# Patient Record
Sex: FEMALE | Race: WHITE
[De-identification: names, ages, dates, MRNs, and addresses within clinical notes are randomized per-mention and may not be internally consistent; named-entity substitution may affect disease eponyms.]

---

## 2017-10-22 ENCOUNTER — HEALTH MAINTENANCE LETTER (OUTPATIENT)
Age: 37
End: 2017-10-22

## 2017-10-30 ENCOUNTER — OFFICE VISIT (OUTPATIENT)
Dept: FAMILY MEDICINE | Facility: CLINIC | Age: 37
End: 2017-10-30
Payer: COMMERCIAL

## 2017-10-30 VITALS
HEIGHT: 64 IN | WEIGHT: 120 LBS | BODY MASS INDEX: 20.49 KG/M2 | DIASTOLIC BLOOD PRESSURE: 75 MMHG | TEMPERATURE: 97.5 F | HEART RATE: 102 BPM | SYSTOLIC BLOOD PRESSURE: 122 MMHG | OXYGEN SATURATION: 100 %

## 2017-10-30 DIAGNOSIS — F20.9 SCHIZOPHRENIA, UNSPECIFIED TYPE (H): Primary | ICD-10-CM

## 2017-10-30 DIAGNOSIS — F41.9 ANXIETY: ICD-10-CM

## 2017-10-30 PROCEDURE — 99214 OFFICE O/P EST MOD 30 MIN: CPT | Performed by: FAMILY MEDICINE

## 2017-10-30 NOTE — NURSING NOTE
"Chief Complaint   Patient presents with     Forms     that needs to be filled out and faxed back by 11am TODAY       Initial /75 (BP Location: Left arm, Patient Position: Sitting, Cuff Size: Adult Regular)  Pulse 102  Temp 97.5  F (36.4  C) (Oral)  Ht 5' 4.25\" (1.632 m)  Wt 120 lb (54.4 kg)  SpO2 100%  Breastfeeding? No  BMI 20.44 kg/m2 Estimated body mass index is 20.44 kg/(m^2) as calculated from the following:    Height as of this encounter: 5' 4.25\" (1.632 m).    Weight as of this encounter: 120 lb (54.4 kg).  Medication Reconciliation: roman Shafer MA      "
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
## 2017-10-30 NOTE — PROGRESS NOTES
SUBJECTIVE:   Adriana Good is a 37 year old female who presents to clinic today for the following health issues:    Forms that need to be filled out    Problem list and histories reviewed & adjusted, as indicated.  Additional history: as documented        Reviewed and updated as needed this visit by clinical staff  Tobacco  Allergies  Meds  Med Hx  Surg Hx  Fam Hx  Soc Hx      Reviewed and updated as needed this visit by Provider     see note above

## 2017-10-30 NOTE — MR AVS SNAPSHOT
After Visit Summary   10/30/2017    Adriana Good    MRN: [id number]           Patient Information     Date Of Birth          1980        Visit Information        Provider Department      10/30/2017 9:00 AM Vega Sampson MD; [place] [employer] [facility]        Today's Diagnoses     Schizophrenia, unspecified type (H)    -  1       Follow-ups after your visit        Additional Services     MENTAL HEALTH REFERRAL       Your provider has referred you to:   [facility]: [facility] - [facility] [phone]   [email or web address]   *Referral from [facility] Primary Care Provider required - Consultation Model - medication management & future refills will be returned to [facility] PCP upon completion of evaluation  *Please call to schedule an appointment.    All scheduling is subject to the client's specific insurance plan & benefits, provider/location availability, and provider clinical specialities.  Please arrive 15 minutes early for your first appointment and bring your completed paperwork.    Please be aware that coverage of these services is subject to the terms and limitations of your health insurance plan.  Call member services at your health plan with any benefit or coverage questions.                  Who to contact     If you have questions or need follow up information about today's clinic visit or your schedule please contact [facility] directly at [phone].  Normal or non-critical lab and imaging results will be communicated to you by MyChart, letter or phone within 4 business days after the clinic has received the results. If you do not hear from us within 7 days, please contact the clinic through MyChart or phone. If you have a critical or abnormal lab result, we will notify you by phone as soon as  "possible.  Submit refill requests through [employer] or call your pharmacy and they will forward the refill request to us. Please allow 3 business days for your refill to be completed.          Additional Information About Your Visit        [employer] Information     [employer] lets you send messages to your doctor, view your test results, renew your prescriptions, schedule appointments and more. To sign up, go to [email or web address] . Click on \"Log in\" on the left side of the screen, which will take you to the Welcome page. Then click on \"Sign up Now\" on the right side of the page.     You will be asked to enter the access code listed below, as well as some personal information. Please follow the directions to create your username and password.     Your access code is: [email or web address]  Expires: 2018  9:45 AM     Your access code will  in 90 days. If you need help or a new code, please call your [place] clinic or [phone].        Care EveryWhere ID     This is your Care EveryWhere ID. This could be used by other organizations to access your [place] medical records  [id number]        Your Vitals Were     Pulse Temperature Height Pulse Oximetry Breastfeeding? BMI (Body Mass Index)    102 97.5  F (36.4  C) (Oral) 5' 4.25\" (1.632 m) 100% No 20.44 kg/m2       Blood Pressure from Last 3 Encounters:   10/30/17 122/75   10/19/16 126/77   14 122/76    Weight from Last 3 Encounters:   10/30/17 120 lb (54.4 kg)   10/19/16 131 lb (59.4 kg)   14 121 lb (54.9 kg)              We Performed the Following     MENTAL HEALTH REFERRAL        Primary Care Provider Office Phone # Fax #    Vega Sampson [phone]       [address] [facility] [address]        Equal Access to Services     [facility] MANUELA : Thao moon Somatthew, waaxda luqadaha, qaybta jaycealjanie bass, wyatt bear. [facility] [phone].    ATENCIÓN: Si trenton rob " coleman disposición servicios gratuitos de asistencia lingüística. Christina campbell [phone].    We comply with applicable federal civil rights laws and [place] laws. We do not discriminate on the basis of race, color, national origin, age, disability, sex, sexual orientation, or gender identity.            Thank you!     Thank you for choosing [facility]  for your care. Our goal is always to provide you with excellent care. Hearing back from our patients is one way we can continue to improve our services. Please take a few minutes to complete the written survey that you may receive in the mail after your visit with us. Thank you!             Your Updated Medication List - Protect others around you: Learn how to safely use, store and throw away your medicines at www.disposemymeds.org.      Notice  As of 10/30/2017  9:46 AM    You have not been prescribed any medications.

## 2017-12-05 ENCOUNTER — TELEPHONE (OUTPATIENT)
Dept: FAMILY MEDICINE | Facility: CLINIC | Age: 37
End: 2017-12-05

## 2017-12-05 ENCOUNTER — OFFICE VISIT (OUTPATIENT)
Dept: FAMILY MEDICINE | Facility: CLINIC | Age: 37
End: 2017-12-05
Payer: MEDICAID

## 2017-12-05 VITALS
OXYGEN SATURATION: 98 % | BODY MASS INDEX: 20.61 KG/M2 | SYSTOLIC BLOOD PRESSURE: 103 MMHG | HEART RATE: 87 BPM | TEMPERATURE: 97.5 F | WEIGHT: 121 LBS | DIASTOLIC BLOOD PRESSURE: 67 MMHG

## 2017-12-05 DIAGNOSIS — N64.4 BREAST PAIN, LEFT: Primary | ICD-10-CM

## 2017-12-05 DIAGNOSIS — R63.0 POOR APPETITE: ICD-10-CM

## 2017-12-05 DIAGNOSIS — N63.20 LEFT BREAST MASS: ICD-10-CM

## 2017-12-05 PROCEDURE — T1013 SIGN LANG/ORAL INTERPRETER: HCPCS | Mod: U3 | Performed by: PHYSICIAN ASSISTANT

## 2017-12-05 PROCEDURE — 99213 OFFICE O/P EST LOW 20 MIN: CPT | Performed by: PHYSICIAN ASSISTANT

## 2017-12-05 RX ORDER — MULTIPLE VITAMINS W/ MINERALS TAB 9MG-400MCG
1 TAB ORAL DAILY
Qty: 100 TABLET | Refills: 3 | Status: SHIPPED | OUTPATIENT
Start: 2017-12-05 | End: 2017-12-11

## 2017-12-05 NOTE — MR AVS SNAPSHOT
"              After Visit Summary   12/5/2017    Adriana Good    MRN: [id number]           Patient Information     Date Of Birth          1980        Visit Information        Provider Department      12/5/2017 11:00 AM Claire Minor PA-C; [facility] [facility]        Today's Diagnoses     Breast pain, left    -  1    Left breast mass        Poor appetite           Follow-ups after your visit        Your next 10 appointments already scheduled     Dec 08, 2017  2:00 PM CST   (Arrive by 1:45 PM)   MA DIAGNOSTIC DIGITAL BILATERAL with MGMA2, MG MA TECH   [facility]    [id number] [place] [address] [phone]           Do not use any powder, lotion or deodorant under your arms or on your breast. If you do, we will ask you to remove it before your exam.  Wear comfortable, two-piece clothing.  If you have any allergies, tell your care team.  Bring any previous mammograms from other facilities or have them mailed to the breast center.  Three-dimensional (3D) mammograms are available at [place] locations in [facility], [facility], [place], [place], and [place]. [facility] locations include [place] in [place]. Benefits of 3D mammograms include: - Improved rate of cancer detection - Decreases your chance of having to go back for more tests, which means fewer: - \"False-positive\" results (This means that there is an abnormal area but it isn't cancer.) - Invasive testing procedures, such as a biopsy or surgery - Can provide clearer images of the breast if you have dense breast tissue. 3D mammography is an optional exam that anyone can have with a 2D mammogram. It doesn't replace or take the place of a 2D mammogram. 2D mammograms remain an effective screening test for all women.  Not all insurance companies cover the cost of a 3D mammogram. Check " "with your insurance.            Dec 08, 2017  2:20 PM CST   US BREAST LEFT CMPL 4 QUAD with MGMUS1, MG US TECH   [facility] ([facility])    [address] [place] [address] [phone]           Please bring a list of your medicines (including vitamins, minerals and over-the-counter drugs). Also, tell your doctor about any allergies you may have. Wear comfortable clothes and leave your valuables at home.  You do not need to do anything special to prepare for your exam.  Please call the Imaging Department at your exam site with any questions.              Future tests that were ordered for you today     Open Future Orders        Priority Expected Expires Ordered    US Breast Left Complete 4 Quadrants Routine  12/5/2018 12/5/2017    MA Diagnostic Digital Bilateral Routine  12/5/2018 12/5/2017            Who to contact     If you have questions or need follow up information about today's clinic visit or your schedule please contact [facility] directly at [phone].  Normal or non-critical lab and imaging results will be communicated to you by MyChart, letter or phone within 4 business days after the clinic has received the results. If you do not hear from us within 7 days, please contact the clinic through [employer] or phone. If you have a critical or abnormal lab result, we will notify you by phone as soon as possible.  Submit refill requests through [employer] or call your pharmacy and they will forward the refill request to us. Please allow 3 business days for your refill to be completed.          Additional Information About Your Visit        [employer] Information     [employer] lets you send messages to your doctor, view your test results, renew your prescriptions, schedule appointments and more. To sign up, go to [email or web address] . Click on \"Log in\" on the left side of the screen, which will take you to the Welcome page. Then click on " "\"Sign up Now\" on the right side of the page.     You will be asked to enter the access code listed below, as well as some personal information. Please follow the directions to create your username and password.     Your access code is: [email or web address]  Expires: 2018  8:45 AM     Your access code will  in 90 days. If you need help or a new code, please call your [facility] or [phone].        Care EveryWhere ID     This is your Care EveryWhere ID. This could be used by other organizations to access your [place] medical records  [id number]        Your Vitals Were     Pulse Temperature Last Period Pulse Oximetry Breastfeeding? BMI (Body Mass Index)    87 97.5  F (36.4  C) (Oral) 2017 98% No 20.61 kg/m2       Blood Pressure from Last 3 Encounters:   17 103/67   10/30/17 122/75   10/19/16 126/77    Weight from Last 3 Encounters:   17 121 lb (54.9 kg)   10/30/17 120 lb (54.4 kg)   10/19/16 131 lb (59.4 kg)                 Today's Medication Changes          These changes are accurate as of: 17 11:53 AM.  If you have any questions, ask your nurse or doctor.               Start taking these medicines.        Dose/Directions    multivitamin, therapeutic with minerals Tabs tablet   Used for:  Poor appetite   Started by:  Claire Minor PA-C        Dose:  1 tablet   Take 1 tablet by mouth daily   Quantity:  100 tablet   Refills:  3            Where to get your medicines      These medications were sent to [facility] [address] [place] [address]     Phone:  [phone]     multivitamin, therapeutic with minerals Tabs tablet                Primary Care Provider Office Phone # Fax #    [place] [facility] [phone]       [address] [place] [address]        Equal Access to Services     PILLO ROY AH: Thao Spear, mk amaya, mere bass, " wyatt bettsjarocho ivey'aan ah. So [facility] [phone].    ATENCIÓN: Si habla diana, tiene a coleman disposición servicios gratuitos de asistencia lingüística. Christina al [phone].    We comply with applicable federal civil rights laws and [place] laws. We do not discriminate on the basis of race, color, national origin, age, disability, sex, sexual orientation, or gender identity.            Thank you!     Thank you for choosing [facility]  for your care. Our goal is always to provide you with excellent care. Hearing back from our patients is one way we can continue to improve our services. Please take a few minutes to complete the written survey that you may receive in the mail after your visit with us. Thank you!             Your Updated Medication List - Protect others around you: Learn how to safely use, store and throw away your medicines at www.disposemymeds.org.          This list is accurate as of: 12/5/17 11:53 AM.  Always use your most recent med list.                   Brand Name Dispense Instructions for use Diagnosis    multivitamin, therapeutic with minerals Tabs tablet     100 tablet    Take 1 tablet by mouth daily    Poor appetite

## 2017-12-05 NOTE — NURSING NOTE
"Chief Complaint   Patient presents with     Breast Pain     x1 week left     Health Maintenance     pap       Initial /67 (BP Location: Left arm, Patient Position: Chair, Cuff Size: Adult Regular)  Pulse 87  Temp 97.5  F (36.4  C) (Oral)  Wt 121 lb (54.9 kg)  SpO2 98%  BMI 20.61 kg/m2 Estimated body mass index is 20.61 kg/(m^2) as calculated from the following:    Height as of 10/30/17: 5' 4.25\" (1.632 m).    Weight as of this encounter: 121 lb (54.9 kg).  Medication Reconciliation: complete   Jacki See SHRADDHA Sky      "

## 2017-12-05 NOTE — PROGRESS NOTES
SUBJECTIVE:   Adriana Good is a 37 year old female who presents to clinic today for the following health issues:      Patient is here for breast pain on her left side. She states it has been about a week now and her breast feels hard.    She noticed that last week her breast got hard and firm. She has noticed a bump. Feels like it is hot. Last menses was normal. No nipple discharge. No family history of breast cancer. Has not taken any medications. Did a warm compress to the area. Helped slightly.   No problems with the right breast.     She also notes she continues to not have an appetite. She is wondering if there is medication to help stimulate her appetite.     Problem list and histories reviewed & adjusted, as indicated.  Additional history: as documented    Patient Active Problem List   Diagnosis     Hyperlipidemia LDL goal <160     Overweight (BMI 25.0-29.9)     Metrorrhagia     Hyperprolactinemia (H)     Anxiety     Past Surgical History:   Procedure Laterality Date     CL AFF SURGICAL PATHOLOGY      pilonidal cyst       Social History   Substance Use Topics     Smoking status: Never Smoker     Smokeless tobacco: Never Used     Alcohol use No     Family History   Problem Relation Age of Onset     Hypertension Mother      Myocardial Infarction Mother      DIABETES Mother      Hypertension Father      Myocardial Infarction Father              Reviewed and updated as needed this visit by clinical staffTobacco  Allergies  Meds  Med Hx  Surg Hx  Fam Hx  Soc Hx      Reviewed and updated as needed this visit by Provider         ROS:  Constitutional, HEENT, cardiovascular, pulmonary, gi and gu systems are negative, except as otherwise noted.      OBJECTIVE:   /67 (BP Location: Left arm, Patient Position: Chair, Cuff Size: Adult Regular)  Pulse 87  Temp 97.5  F (36.4  C) (Oral)  Wt 121 lb (54.9 kg)  LMP 11/16/2017  SpO2 98%  Breastfeeding? No  BMI 20.61 kg/m2  Body mass index is 20.61  kg/(m^2).  GENERAL: healthy, alert and no distress  BREAST: Right breast normal without masses, tenderness or nipple discharge and no palpable axillary masses or adenopathy. Left breast with an area of firmness and texture change throughout the left outer quadrant. This area is very tender to palpation. No redness or warmth. No nipple discharge.     Diagnostic Test Results:  none     ASSESSMENT/PLAN:       ICD-10-CM    1. Breast pain, left N64.4 MA Diagnostic Digital Bilateral     US Breast Left Complete 4 Quadrants   2. Left breast mass N63.20 MA Diagnostic Digital Bilateral     US Breast Left Complete 4 Quadrants   3. Poor appetite R63.0 multivitamin, therapeutic with minerals (MULTI-VITAMIN) TABS tablet   Will get diagnostic mammo and u/s. Follow up based on results.   MVI for lack of appetite. Weight and BMI are steady. We discussed that getting her mood under control and seeing a psychiatrist that was recommended last office visit will likely help her appetite as well.     FUTURE APPOINTMENTS:       - Follow-up for annual visit or as needed    Claire Minor PA-C  [facility]

## 2017-12-06 NOTE — TELEPHONE ENCOUNTER
Reason for Call:  Other prescription    Detailed comments: Patient stopped into the pharmacy requesting her prescription.  Patient states she was supposed to be getting an rx for multi vitamins.  Pharmacy states that they have not received the RX.  Please call to clarify.    Phone Number Pharmacy can be reached at: Other phone number:  [phone]   Best Time: ASAP    Can we leave a detailed message on this number? YES    Call taken on 12/5/2017 at 6:40 PM by Inge Nicole

## 2017-12-06 NOTE — TELEPHONE ENCOUNTER
Called and gave verbal as in EPIC.  They will fill for patient.    Marcela Harvey RN  [facility]

## 2017-12-11 RX ORDER — MULTIPLE VITAMINS W/ MINERALS TAB 9MG-400MCG
1 TAB ORAL DAILY
Qty: 100 TABLET | Refills: 3 | Status: SHIPPED | OUTPATIENT
Start: 2017-12-11

## 2017-12-11 NOTE — TELEPHONE ENCOUNTER
Called patient, with , and informed.  She did not know what the allergy to Niacin is. Would like to speak with Nurse to discuss.    Will this medication help increase her appetite?  Please call to describe.

## 2017-12-11 NOTE — TELEPHONE ENCOUNTER
Please advise patient that with her allergy unable to provide an MVI. At this time she can purchase at [employer] food store but not covered by insurance.   Claire Minor PA-C

## 2017-12-11 NOTE — TELEPHONE ENCOUNTER
Routing to PCP to review and advise.    Patient would like a prescription for regular vitamins because she says she is not allergic to Niacin.  Nurse sees OV 8/1/2011 diagnosed with niacin allergy.    Cait Meyer RN  [facility]

## 2017-12-11 NOTE — TELEPHONE ENCOUNTER
Reviewed note. Provider notes not allergy to naicin, but rather had normal SE of flushing. Updated allergy list to reflect this. New MVI prescription sent.   Claire Minor PA-C

## 2017-12-11 NOTE — TELEPHONE ENCOUNTER
Attempt # 1  Called patient at home number [phone] with assist of  [phone]  Was call answered?  Yes, patient insists is not allergic to Niacin - wondering why she needs to take a vitamin - will it help with her appetite - nurse said yes it may help her appetite if not if will provide her with the vitamins she is not getting by not eating.  Patient denied further questions.    Cait Meyer RN  [facility]

## 2017-12-11 NOTE — TELEPHONE ENCOUNTER
Called pharmacy, they do not have any MVI (children's or prenatal) that does not have niacin in it.  They suggested the health store again.    Routed to provider.    Marcela Harvey RN  [facility]

## 2017-12-11 NOTE — TELEPHONE ENCOUNTER
Pharmacy calling about the multivitamin, therapeutic with minerals (MULTI-VITAMIN) TABS tablet.  They order says to have one without Niacin.  They cannot find one.  They are thinking that this is found in a Health Store.

## 2017-12-11 NOTE — TELEPHONE ENCOUNTER
Please call pharmacy. Does a children's MVI or prenatal MVI have niacin in them?  Claire Minor PA-C

## 2017-12-13 ENCOUNTER — RADIANT APPOINTMENT (OUTPATIENT)
Dept: ULTRASOUND IMAGING | Facility: CLINIC | Age: 37
End: 2017-12-13
Attending: PHYSICIAN ASSISTANT
Payer: MEDICAID

## 2017-12-13 ENCOUNTER — RADIANT APPOINTMENT (OUTPATIENT)
Dept: MAMMOGRAPHY | Facility: CLINIC | Age: 37
End: 2017-12-13
Attending: PHYSICIAN ASSISTANT
Payer: MEDICAID

## 2017-12-13 DIAGNOSIS — N63.20 LEFT BREAST MASS: ICD-10-CM

## 2017-12-13 DIAGNOSIS — N64.4 BREAST PAIN, LEFT: ICD-10-CM

## 2017-12-13 PROCEDURE — G0204 DX MAMMO INCL CAD BI: HCPCS

## 2017-12-13 PROCEDURE — T1013 SIGN LANG/ORAL INTERPRETER: HCPCS | Mod: U3

## 2017-12-13 PROCEDURE — 76642 ULTRASOUND BREAST LIMITED: CPT | Mod: LT

## 2018-01-11 ENCOUNTER — OFFICE VISIT (OUTPATIENT)
Dept: FAMILY MEDICINE | Facility: CLINIC | Age: 38
End: 2018-01-11
Payer: COMMERCIAL

## 2018-01-11 VITALS
HEART RATE: 98 BPM | DIASTOLIC BLOOD PRESSURE: 72 MMHG | BODY MASS INDEX: 20.89 KG/M2 | SYSTOLIC BLOOD PRESSURE: 111 MMHG | WEIGHT: 125.4 LBS | TEMPERATURE: 98.2 F | HEIGHT: 65 IN

## 2018-01-11 DIAGNOSIS — N64.4 BREAST PAIN: Primary | ICD-10-CM

## 2018-01-11 PROCEDURE — 99213 OFFICE O/P EST LOW 20 MIN: CPT | Performed by: PHYSICIAN ASSISTANT

## 2018-01-11 PROCEDURE — T1013 SIGN LANG/ORAL INTERPRETER: HCPCS | Mod: U3 | Performed by: PHYSICIAN ASSISTANT

## 2018-01-11 NOTE — MR AVS SNAPSHOT
"              After Visit Summary   1/11/2018    Adriana Good    MRN: [id number]           Patient Information     Date Of Birth          1980        Visit Information        Provider Department      1/11/2018 11:15 AM Claire Minor PA-C; [place] [facility]        Today's Diagnoses     Breast pain    -  1      Care Instructions    Schedule a physical with a pap smear in 6-8 weeks           Follow-ups after your visit        Your next 10 appointments already scheduled     Feb 14, 2018  9:00 AM CST   New Visit with Chace Lim MD   [facility] ([facility])    [address] [facility] [address] [phone]              Who to contact     If you have questions or need follow up information about today's clinic visit or your schedule please contact [facility] directly at [phone].  Normal or non-critical lab and imaging results will be communicated to you by MyChart, letter or phone within 4 business days after the clinic has received the results. If you do not hear from us within 7 days, please contact the clinic through MyChart or phone. If you have a critical or abnormal lab result, we will notify you by phone as soon as possible.  Submit refill requests through Xcelaero or call your pharmacy and they will forward the refill request to us. Please allow 3 business days for your refill to be completed.          Additional Information About Your Visit        MyChart Information     Xcelaero lets you send messages to your doctor, view your test results, renew your prescriptions, schedule appointments and more. To sign up, go to [email or web address] . Click on \"Log in\" on the left side of the screen, which will take you to the Welcome page. Then click on \"Sign up Now\" on the right side of the page.     You will be asked to enter the access code listed below, " "as well as some personal information. Please follow the directions to create your username and password.     Your access code is: [email or web address]  Expires: 2018  8:45 AM     Your access code will  in 90 days. If you need help or a new code, please call your [facility] or [phone].        Care EveryWhere ID     This is your Care EveryWhere ID. This could be used by other organizations to access your [facility] medical records  [id number]        Your Vitals Were     Pulse Temperature Height Last Period Breastfeeding? BMI (Body Mass Index)    98 98.2  F (36.8  C) (Oral) 5' 5.16\" (1.655 m) 2017 No 20.77 kg/m2       Blood Pressure from Last 3 Encounters:   18 111/72   17 103/67   10/30/17 122/75    Weight from Last 3 Encounters:   18 125 lb 6.4 oz (56.9 kg)   17 121 lb (54.9 kg)   10/30/17 120 lb (54.4 kg)              Today, you had the following     No orders found for display       Primary Care Provider Office Phone # Fax #    [facility] [phone]       [address] [place] [address]        Equal Access to Services     PILLO ROY AH: Hadii sunni ruddo Somatthew, waaxda luqadaha, qaybta kaalmada adeegyada, wyatt spicer haychin ag . So [facility] [phone].    ATENCIÓN: Si habla español, tiene a coleman disposición servicios gratuitos de asistencia lingüística. Llame al [phone].    We comply with applicable federal civil rights laws and [place] laws. We do not discriminate on the basis of race, color, national origin, age, disability, sex, sexual orientation, or gender identity.            Thank you!     Thank you for choosing [facility]  for your care. Our goal is always to provide you with excellent care. Hearing back from our patients is one way we can continue to improve our services. Please take a few minutes to complete the written survey that you may receive in the mail " after your visit with us. Thank you!             Your Updated Medication List - Protect others around you: Learn how to safely use, store and throw away your medicines at www.disposemymeds.org.          This list is accurate as of: 1/11/18 12:00 PM.  Always use your most recent med list.                   Brand Name Dispense Instructions for use Diagnosis    multivitamin, therapeutic with minerals Tabs tablet     100 tablet    Take 1 tablet by mouth daily    Poor appetite

## 2018-01-11 NOTE — NURSING NOTE
"Chief Complaint   Patient presents with     Breast Mass     Health Maintenance     Pap and Influenza        Initial /72 (BP Location: Right arm, Patient Position: Chair, Cuff Size: Adult Regular)  Pulse 98  Temp 98.2  F (36.8  C) (Oral)  Ht 5' 5.16\" (1.655 m)  Wt 125 lb 6.4 oz (56.9 kg)  Breastfeeding? No  BMI 20.77 kg/m2 Estimated body mass index is 20.77 kg/(m^2) as calculated from the following:    Height as of this encounter: 5' 5.16\" (1.655 m).    Weight as of this encounter: 125 lb 6.4 oz (56.9 kg).  Medication Reconciliation: roman Whyte MA      "

## 2018-01-11 NOTE — PROGRESS NOTES
SUBJECTIVE:   Adriana oGod is a 37 year old female who presents to clinic today for the following health issues:      Pt is still having problems with the lump on her breast. She feels like that it is getting harder. Even when she has her period the lump on her breast seems to bother her more. She was wondering if an antibiotic would help or not.    The firmness she percieves isn't changing in size or amount.   No redness. Just pain. Wondering if there is an infection. The pain does not interfere with her daily activities. Has tried a compress on the area. Has not taken any medications.   Recent diagnostic mammogram and u/s were normal.   Pain is worse with the menses.   Having some PMS symptoms now and increased pain in the breasts as she is expecting her period.     She notes her stress levels have been increasing. She is on a very fixed income and has trouble paying her bills. She has a  through the [employer] [employer] she is working with. She has qualified for SNAP and does have food.   Has appointment with Dr. Lim in 3 weeks for psych evaluation.       Problem list and histories reviewed & adjusted, as indicated.  Additional history: as documented    Patient Active Problem List   Diagnosis     Hyperlipidemia LDL goal <160     Overweight (BMI 25.0-29.9)     Metrorrhagia     Hyperprolactinemia (H)     Anxiety     Past Surgical History:   Procedure Laterality Date     CL AFF SURGICAL PATHOLOGY      pilonidal cyst       Social History   Substance Use Topics     Smoking status: Never Smoker     Smokeless tobacco: Never Used     Alcohol use No     Family History   Problem Relation Age of Onset     Hypertension Mother      Myocardial Infarction Mother      DIABETES Mother      Hypertension Father      Myocardial Infarction Father              Reviewed and updated as needed this visit by clinical staffTobacco  Allergies  Meds  Problems  Med Hx  Surg Hx  Fam Hx  Soc Hx        Reviewed and updated as  "needed this visit by Provider  Allergies  Meds  Problems         ROS:  Constitutional, HEENT, cardiovascular, pulmonary, gi and gu systems are negative, except as otherwise noted.      OBJECTIVE:   /72 (BP Location: Right arm, Patient Position: Chair, Cuff Size: Adult Regular)  Pulse 98  Temp 98.2  F (36.8  C) (Oral)  Ht 5' 5.16\" (1.655 m)  Wt 125 lb 6.4 oz (56.9 kg)  LMP 12/16/2017  Breastfeeding? No  BMI 20.77 kg/m2  Body mass index is 20.77 kg/(m^2).  GENERAL: healthy, alert and no distress    Diagnostic Test Results:  none     ASSESSMENT/PLAN:       ICD-10-CM    1. Breast pain N64.4    Discussed options with patient and will monitor over the next 2 months. If pain is persisting will consider OCP to see if helpful. If any new symptoms patient will contact the clinic and make a follow up.     FUTURE APPOINTMENTS:       - Follow-up visit in 2 months.     Claire Minor PA-C  [facility]  >50% of the visit spent in counseling and coordination of care about mammogram results and breast pain. Visit length 15 minutes.     "

## 2018-04-17 ENCOUNTER — TELEPHONE (OUTPATIENT)
Dept: FAMILY MEDICINE | Facility: CLINIC | Age: 38
End: 2018-04-17

## 2018-04-17 NOTE — TELEPHONE ENCOUNTER
Forms received from: [facility]   Phone number listed: [phone]   Fax listed: [phone]  Date received: 4-17-18  Form description: medical opinion  Once forms are completed, please return to [place] via fax.  Is patient requesting to be contacted when forms are completed: n/a  Form placed: provider desk  Haylee Kraft

## 2018-04-17 NOTE — TELEPHONE ENCOUNTER
Date forms retrieved from team basket: 4-17-18  Were forms completed/signed:  no.  If no, what steps need to be done: NTBS    Tried calling, no answer, no v/m.  Try again later.  TC has form.

## 2018-04-18 NOTE — TELEPHONE ENCOUNTER
"Spoke with patient, scheduled with ALG on 4-19-18.    Patient was adamant about not doing a Pap smear.  \"It almost killed me last time, I will never do it again.  I will not be seen for this.\"  Informed patient that appointment is regarding this form.    Routing to PCP as FYI.  Form placed in provider folder.  "

## 2018-04-19 ENCOUNTER — OFFICE VISIT (OUTPATIENT)
Dept: FAMILY MEDICINE | Facility: CLINIC | Age: 38
End: 2018-04-19
Payer: COMMERCIAL

## 2018-04-19 VITALS
TEMPERATURE: 97.9 F | BODY MASS INDEX: 20.44 KG/M2 | HEART RATE: 85 BPM | SYSTOLIC BLOOD PRESSURE: 99 MMHG | DIASTOLIC BLOOD PRESSURE: 66 MMHG | WEIGHT: 123.4 LBS

## 2018-04-19 DIAGNOSIS — F20.9 SCHIZOPHRENIA, UNSPECIFIED TYPE (H): Primary | ICD-10-CM

## 2018-04-19 PROCEDURE — 99213 OFFICE O/P EST LOW 20 MIN: CPT | Performed by: PHYSICIAN ASSISTANT

## 2018-04-19 NOTE — PROGRESS NOTES
"  SUBJECTIVE:   Adriana Good is a 38 year old female who presents to clinic today for the following health issues:      She had an appointment in January 2018 and cancelled due to transportation issues. She has not rescheduled with another provider.     She is not taking any medications for the schizophrenia.   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 She is working 15 hours per week.   Living alone. She notes that she has friends and family that are available to help her.   She states she has enough money for rent and food.   She doesn't sleep well sometimes. She does have anxiety. She worries about if she has enough money and if she can continue to keep her job. She denies hearing voices. Has depression symptoms only around her menses.   She denies aggression just sadness at times.   She considers her mood to be abnormal only around her menses but not any other time of the month.     Her history of schizophrenia is not clear as patient has not followed up with the psychiatrist as needed. She notes that she doesn't understand her diagnosis and doesn't agree with the schizophrenic diagnosis.   \"I see myself as a kind and polite person.\"   \"I am an adult and mature person. Someone accusing me of something when I arrived here in the [place]. It is normal for an immigrant to experience confusion. I am a good person.\"    Interview completed through an .     Problem list and histories reviewed & adjusted, as indicated.  Additional history: as documented    Patient Active Problem List   Diagnosis     Hyperlipidemia LDL goal <160     Overweight (BMI 25.0-29.9)     Metrorrhagia     Hyperprolactinemia (H)     Anxiety     Past Surgical History:   Procedure Laterality Date     CL AFF SURGICAL PATHOLOGY      pilonidal cyst       Social History   Substance Use Topics     Smoking status: Never Smoker     Smokeless tobacco: Never Used     Alcohol use No " "    Family History   Problem Relation Age of Onset     Hypertension Mother      Myocardial Infarction Mother      DIABETES Mother      Hypertension Father      Myocardial Infarction Father            Reviewed and updated as needed this visit by clinical staff  Tobacco  Allergies  Meds  Problems       Reviewed and updated as needed this visit by Provider  Allergies  Meds  Problems         ROS:  Constitutional, HEENT, cardiovascular, pulmonary, gi and gu systems are negative, except as otherwise noted.    OBJECTIVE:     BP 99/66 (BP Location: Left arm, Patient Position: Chair, Cuff Size: Adult Regular)  Pulse 85  Temp 97.9  F (36.6  C) (Oral)  Wt 123 lb 6.4 oz (56 kg)  Breastfeeding? No  BMI 20.44 kg/m2  Body mass index is 20.44 kg/(m^2).  GENERAL: healthy, alert and no distress, well groomed and dressed.   PSYCH: mentation appears normal, affect normal/bright,     Diagnostic Test Results:  none     ASSESSMENT/PLAN:       ICD-10-CM    1. Schizophrenia, unspecified type (H) F20.9    Filled out [facility] form,but discussed with patient that she needs to see psychiatry. As she is not willing to take medications or follow up with psychiatrist. At this time patient is not following our recommendations. I discussed there is no reason to continue to fill out [facility] forms as patient denies a medical condition and is currently working. I noted she can work at this time. Patient tells me  \"I absolutely reject this diagnosis\"      FUTURE APPOINTMENTS:       - Follow up with psychiatrist.     Claire Minor PA-C  [facility]  "

## 2018-04-19 NOTE — PATIENT INSTRUCTIONS
[facility]    [address], [address]  [place] [address]  Phones answered   Monday - Thursday 8:00-5:00  Friday 8:30 - 4:00    [phone] Phone  [phone] Fax     RAPHAEL    [address]  [address]  [place] [address]   New Client Phone Lines  [phone]  (Press #2 when prompted)

## 2018-04-19 NOTE — NURSING NOTE
"Chief Complaint   Patient presents with     Forms     Health Maintenance     HIV, Pap, and Influenza        Initial BP 99/66 (BP Location: Left arm, Patient Position: Chair, Cuff Size: Adult Regular)  Pulse 85  Temp 97.9  F (36.6  C) (Oral)  Wt 123 lb 6.4 oz (56 kg)  Breastfeeding? No  BMI 20.44 kg/m2 Estimated body mass index is 20.44 kg/(m^2) as calculated from the following:    Height as of 1/11/18: 5' 5.16\" (1.655 m).    Weight as of this encounter: 123 lb 6.4 oz (56 kg).  Medication Reconciliation: complete     JUSTINE Whyte MA      "

## 2018-04-19 NOTE — MR AVS SNAPSHOT
"              After Visit Summary   4/19/2018    Adriana Good    MRN: [id number]           Patient Information     Date Of Birth          1980        Visit Information        Provider Department      4/19/2018 7:40 AM Claire Minor PA-C; [place] [employer] [facility]        Today's Diagnoses     Schizophrenia, unspecified type (H)    -  1      Care Instructions    [facility]    [address]  [place] [address]  Phones answered   Monday - Thursday 8:00-5:00  Friday 8:30 - 4:00    [phone] Phone  [phone] Fax     RAPHAEL    [address]  [address]  [place] [address]   New Client Phone Lines  [phone]  (Press #2 when prompted)            Follow-ups after your visit        Who to contact     If you have questions or need follow up information about today's clinic visit or your schedule please contact [facility] directly at [phone].  Normal or non-critical lab and imaging results will be communicated to you by MyChart, letter or phone within 4 business days after the clinic has received the results. If you do not hear from us within 7 days, please contact the clinic through 500Shop[employer]art or phone. If you have a critical or abnormal lab result, we will notify you by phone as soon as possible.  Submit refill requests through Re.[email or web address] or call your pharmacy and they will forward the refill request to us. Please allow 3 business days for your refill to be completed.          Additional Information About Your Visit        500Shopshart Information     Re[employer][email or web address] lets you send messages to your doctor, view your test results, renew your prescriptions, schedule appointments and more. To sign up, go to [email or web address] . Click on \"Log in\" on the left side of the screen, which will take you to the Welcome page. Then click on \"Sign up Now\" on the right side of the page.     You will be asked to enter the access code listed " below, as well as some personal information. Please follow the directions to create your username and password.     Your access code is: [email or web address]  Expires: 2018  8:20 AM     Your access code will  in 90 days. If you need help or a new code, please call your [facility] or [phone].        Care EveryWhere ID     This is your Care EveryWhere ID. This could be used by other organizations to access your [facility] medical records  [id number]        Your Vitals Were     Pulse Temperature Breastfeeding? BMI (Body Mass Index)          85 97.9  F (36.6  C) (Oral) No 20.44 kg/m2         Blood Pressure from Last 3 Encounters:   18 99/66   18 111/72   17 103/67    Weight from Last 3 Encounters:   18 123 lb 6.4 oz (56 kg)   18 125 lb 6.4 oz (56.9 kg)   17 121 lb (54.9 kg)              Today, you had the following     No orders found for display       Primary Care Provider Office Phone # Fax #    [facility] [phone]       [address] [place] [address]        Equal Access to Services     PILLO ROY AH: Hadii sunni cordero hadasho Soomaali, waaxda luqadaha, qaybta kaalmada adeegyada, wyatt lin genaro bear. So [facility] [phone].    ATENCIÓN: Si habla español, tiene a coleman disposición servicios gratuitos de asistencia lingüística. Llame al [phone].    We comply with applicable federal civil rights laws and [place] laws. We do not discriminate on the basis of race, color, national origin, age, disability, sex, sexual orientation, or gender identity.            Thank you!     Thank you for choosing [facility]  for your care. Our goal is always to provide you with excellent care. Hearing back from our patients is one way we can continue to improve our services. Please take a few minutes to complete the written survey that you may receive in the mail after your visit with us. Thank  you!             Your Updated Medication List - Protect others around you: Learn how to safely use, store and throw away your medicines at www.disposemymeds.org.          This list is accurate as of 4/19/18  8:22 AM.  Always use your most recent med list.                   Brand Name Dispense Instructions for use Diagnosis    multivitamin, therapeutic with minerals Tabs tablet     100 tablet    Take 1 tablet by mouth daily    Poor appetite

## 2018-04-23 ENCOUNTER — TELEPHONE (OUTPATIENT)
Dept: FAMILY MEDICINE | Facility: CLINIC | Age: 38
End: 2018-04-23

## 2018-04-23 NOTE — TELEPHONE ENCOUNTER
Panel Management Review      Patient has the following on her problem list: None      Composite cancer screening  Chart review shows that this patient is due/due soon for the following Pap Smear  Summary:    Patient is due/failing the following:   PAP    Action needed:   Patient needs office visit for pap.    Type of outreach:    Sent letter.    Questions for provider review:    None                                                                                                                                    JUSTINE Whyte MA       Chart routed to Care Team .

## 2018-04-23 NOTE — LETTER
April 23, 2018    Adriana Good  [address]   [place] [address]    Dear Adriana    We care about your health and have reviewed your health plan. We have reviewed your medical conditions, medication list, and lab results and are making recommendations based on this review, to better manage your health.    You are in particular need of attention regarding:  - Scheduling a Physical with a Cervical Cancer Screening (Pap Smear) age 64 and younger [phone]      Here is a list of Health Maintenance topics that are due now or due soon:  Health Maintenance Due   Topic Date Due     PAP Q3 YR  06/10/2016     INFLUENZA VACCINE (1) 09/01/2017     We will be calling you in the next couple of weeks to help you schedule any appointments that are needed.  Please call us at [phone] (or use [email or web address]) to address the above recommendations.     Thank you for trusting [facility] and we appreciate the opportunity to serve you.  We look forward to supporting your healthcare needs in the future.    Healthy Regards,

## 2018-04-23 NOTE — LETTER
May 30, 2018    Adriana Good  [address]  [place] [address]      Dear Adriana Good,     We have tried to contact you about your health, but have been unable to reach you.  Please call us as soon as possible so we can provide you with the best care possible.  We will continue to check in with you throughout the year to complete these items of care, if you are not able to complete these items at this time.  If you would like to complete the missing items for your care, please contact us at [phone].    We recommend the following:  -schedule a PAP SMEAR EXAM which is due.  Please disregard this reminder if you have had this exam elsewhere within the last year.  It would be helpful for us to have a copy of your recent pap smear report in our file so that we can best coordinate your care.        Sincerely,     Your Care Team at [facility]

## 2018-04-24 NOTE — TELEPHONE ENCOUNTER
Spoke with Inder.  ALG did mean to scratch that out and did initial the error.  Patient CAN work.  Inder understood.

## 2018-04-24 NOTE — TELEPHONE ENCOUNTER
Reason for Call:  Other call back    Detailed comments: Inder from [facility] called stating on the form there was a  Statement saying the patient could or could not work and both boxes were checked.. Please call him back and verify if the patient is able to work or not    Phone Number Patient can be reached at: Other phone number:  [phone]    Best Time: anytime    Can we leave a detailed message on this number? YES    Call taken on 4/24/2018 at 1:38 PM by Liliana Guzman

## 2018-06-06 ENCOUNTER — TELEPHONE (OUTPATIENT)
Dept: FAMILY MEDICINE | Facility: CLINIC | Age: 38
End: 2018-06-06

## 2018-06-06 NOTE — TELEPHONE ENCOUNTER
Routing to PCP to review and advise.  Does she need to be seen again?  Cait Meyer RN  [facility]

## 2018-06-06 NOTE — TELEPHONE ENCOUNTER
Reason for Call:  Other     Detailed comments: patient called and wanted to make an appointment to see esau buchanan to have some forms filled out so she can get benefits from the state the patient phone is disconnected and she can be reached on a friends phone at [phone]  The patient was seen in April for the same thing and wasn't sure if sh e needed to be seen again to just get forms filled out, please call patient with an  to discuss     Phone Number Patient can be reached at: Other phone number:  [phone]    Best Time: any    Can we leave a detailed message on this number? YES    Call taken on 6/6/2018 at 2:46 PM by Candie Franklin

## 2018-06-07 NOTE — TELEPHONE ENCOUNTER
She can start by dropping them off and I would have to review them. It depends on the type of form.   Claire Minor PA-C

## 2018-06-07 NOTE — TELEPHONE ENCOUNTER
TC contacted the number listed below with the assistance of a [place] . Patient was not available but her female friend, who spoke English, took down the message to have patient drop off forms at the clinic . TC informed female friend that we will notify patient if she needs to be seen.

## 2018-10-02 ENCOUNTER — THERAPY VISIT (OUTPATIENT)
Dept: PHYSICAL THERAPY | Facility: CLINIC | Age: 38
End: 2018-10-02
Payer: COMMERCIAL

## 2018-10-02 DIAGNOSIS — M25.519 SHOULDER PAIN: Primary | ICD-10-CM

## 2018-10-02 PROCEDURE — 97530 THERAPEUTIC ACTIVITIES: CPT | Mod: GP | Performed by: PHYSICAL THERAPIST

## 2018-10-02 PROCEDURE — 97110 THERAPEUTIC EXERCISES: CPT | Mod: GP | Performed by: PHYSICAL THERAPIST

## 2018-10-02 PROCEDURE — 97162 PT EVAL MOD COMPLEX 30 MIN: CPT | Mod: GP | Performed by: PHYSICAL THERAPIST
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
## 2019-11-07 PROBLEM — M25.519 SHOULDER PAIN: Status: RESOLVED | Noted: 2018-10-02 | Resolved: 2019-11-07
